# Patient Record
Sex: FEMALE | Race: WHITE | Employment: OTHER | URBAN - METROPOLITAN AREA
[De-identification: names, ages, dates, MRNs, and addresses within clinical notes are randomized per-mention and may not be internally consistent; named-entity substitution may affect disease eponyms.]

---

## 2024-06-30 ENCOUNTER — APPOINTMENT (OUTPATIENT)
Dept: CT IMAGING | Age: 64
End: 2024-06-30
Payer: COMMERCIAL

## 2024-06-30 ENCOUNTER — APPOINTMENT (OUTPATIENT)
Dept: GENERAL RADIOLOGY | Age: 64
End: 2024-06-30
Payer: COMMERCIAL

## 2024-06-30 ENCOUNTER — HOSPITAL ENCOUNTER (EMERGENCY)
Age: 64
Discharge: HOME | End: 2024-06-30
Payer: COMMERCIAL

## 2024-06-30 VITALS
DIASTOLIC BLOOD PRESSURE: 95 MMHG | HEART RATE: 85 BPM | OXYGEN SATURATION: 97 % | SYSTOLIC BLOOD PRESSURE: 175 MMHG | RESPIRATION RATE: 16 BRPM | TEMPERATURE: 97.6 F

## 2024-06-30 DIAGNOSIS — S52.502A CLOSED FRACTURE OF DISTAL END OF LEFT RADIUS, UNSPECIFIED FRACTURE MORPHOLOGY, INITIAL ENCOUNTER: Primary | ICD-10-CM

## 2024-06-30 PROCEDURE — 70486 CT MAXILLOFACIAL W/O DYE: CPT

## 2024-06-30 PROCEDURE — 70450 CT HEAD/BRAIN W/O DYE: CPT

## 2024-06-30 PROCEDURE — 71045 X-RAY EXAM CHEST 1 VIEW: CPT

## 2024-06-30 PROCEDURE — 29125 APPL SHORT ARM SPLINT STATIC: CPT

## 2024-06-30 PROCEDURE — 99284 EMERGENCY DEPT VISIT MOD MDM: CPT

## 2024-06-30 PROCEDURE — 73110 X-RAY EXAM OF WRIST: CPT

## 2024-06-30 PROCEDURE — 6370000000 HC RX 637 (ALT 250 FOR IP): Performed by: NURSE PRACTITIONER

## 2024-06-30 RX ORDER — HYDROCODONE BITARTRATE AND ACETAMINOPHEN 5; 325 MG/1; MG/1
1 TABLET ORAL ONCE
Status: COMPLETED | OUTPATIENT
Start: 2024-06-30 | End: 2024-06-30

## 2024-06-30 RX ORDER — HYDROCODONE BITARTRATE AND ACETAMINOPHEN 5; 325 MG/1; MG/1
1 TABLET ORAL EVERY 6 HOURS PRN
Qty: 12 TABLET | Refills: 0 | Status: SHIPPED | OUTPATIENT
Start: 2024-06-30 | End: 2024-07-03

## 2024-06-30 RX ADMIN — HYDROCODONE BITARTRATE AND ACETAMINOPHEN 1 TABLET: 5; 325 TABLET ORAL at 19:01

## 2024-06-30 ASSESSMENT — PAIN DESCRIPTION - DESCRIPTORS
DESCRIPTORS: ACHING
DESCRIPTORS: ACHING

## 2024-06-30 ASSESSMENT — PAIN DESCRIPTION - ORIENTATION
ORIENTATION: LEFT
ORIENTATION: LEFT

## 2024-06-30 ASSESSMENT — PAIN SCALES - GENERAL
PAINLEVEL_OUTOF10: 6
PAINLEVEL_OUTOF10: 4

## 2024-06-30 ASSESSMENT — PAIN DESCRIPTION - LOCATION
LOCATION: FACE
LOCATION: ARM

## 2024-06-30 ASSESSMENT — PAIN - FUNCTIONAL ASSESSMENT: PAIN_FUNCTIONAL_ASSESSMENT: 0-10

## 2024-06-30 NOTE — ED PROVIDER NOTES
(.cctime)       PAST MEDICAL HISTORY      has a past medical history of Obesity and Uterine cancer.     EMERGENCY DEPARTMENT COURSE and DIFFERENTIAL DIAGNOSIS/MDM:   Vitals:    Vitals:    06/30/24 1644 06/30/24 1858   BP: (!) 163/94 (!) 175/95   Pulse: (!) 101 85   Resp: 15 16   Temp: 97.3 °F (36.3 °C) 97.6 °F (36.4 °C)   TempSrc: Oral Oral   SpO2: 97% 97%       Patient was given the following medications:  Medications   HYDROcodone-acetaminophen (NORCO) 5-325 MG per tablet 1 tablet (1 tablet Oral Given 6/30/24 1901)             Is this patient to be included in the SEP-1 Core Measure due to severe sepsis or septic shock?   No   Exclusion criteria - the patient is NOT to be included for SEP-1 Core Measure due to:  2+ SIRS criteria are not met        Chronic Conditions affecting care:    has a past medical history of Obesity and Uterine cancer.    CONSULTS: (Who and What was discussed)  None      Records Reviewed (External and Source)     CC/HPI Summary, DDx, ED Course, and Reassessment: Patient presented to the emergency department today after she had suffered a fall while playing basketball with a child.  Apparently she fell forward actually struck her left side of face on pavement causing some bruising but no open injury.  She also reported landing on the left wrist which displayed some bruising.  Unfortunately her wrist was fractured.  She has a closed comminuted impacted intra-articular fracture of the distal radius.  Her extremity was splinted with a sugar-tong splint.  Splint was applied by my ER nurse and I performed a post splint review which displayed a appropriately placed stirrup splint neurovascular intact.  Brisk cap refill to digits of the hand.  Looks well and well performed.  She was placed in a sling.  As far as her bruising to her face CT scan of face and CT scan of brain without contrast were performed which displayed no evidence of bony abnormality or fracture.  She has no evidence of orbital

## 2024-07-01 ASSESSMENT — VISUAL ACUITY: OU: 1

## 2024-07-02 ENCOUNTER — OFFICE VISIT (OUTPATIENT)
Dept: ORTHOPEDIC SURGERY | Age: 64
End: 2024-07-02
Payer: COMMERCIAL

## 2024-07-02 VITALS — HEIGHT: 69 IN | WEIGHT: 279 LBS | BODY MASS INDEX: 41.32 KG/M2

## 2024-07-02 DIAGNOSIS — S52.552A OTHER CLOSED EXTRA-ARTICULAR FRACTURE OF DISTAL END OF LEFT RADIUS, INITIAL ENCOUNTER: Primary | ICD-10-CM

## 2024-07-02 PROCEDURE — 99204 OFFICE O/P NEW MOD 45 MIN: CPT | Performed by: ORTHOPAEDIC SURGERY

## 2024-07-03 ENCOUNTER — PREP FOR PROCEDURE (OUTPATIENT)
Dept: ORTHOPEDIC SURGERY | Age: 64
End: 2024-07-03

## 2024-07-03 DIAGNOSIS — S52.552A CLOSED EXTRAARTICULAR FRACTURE OF DISTAL RADIUS, LEFT, INITIAL ENCOUNTER: ICD-10-CM

## 2024-07-03 RX ORDER — LISINOPRIL 10 MG/1
10 TABLET ORAL DAILY
COMMUNITY

## 2024-07-03 NOTE — PROGRESS NOTES
Ruth Hahn    Age 63 y.o.    female    1960    MRN [id number]    7/8/2024  Arrival Time_____________  OR Time____________115 Min     Procedure(s):  LEFT DISTAL RADIUS OPEN REDUCTION INTERNAL FIXATION                               ARTHRE[facility] NOTE:  SUPRACLAVICULAR BLOCK                      General   Surgeon(s):  Johnny Gann, MD      DAY ADMIT ___  SDS/OP ___  OUTPT IN BED ___        Phone [phone] (home)                  PCP _____________________ Phone_________________ Epic ( ) Epic CE ( ) Appt ________    NOTES: _________________________________________ Consult/Cardio _______________    ____________________________________________________________________________    ____________________________________________________________________________  PAT APPT DATE:________ TIME: ________  FAXED QAD: _______  (__) H&P w/ Hospitalist    (__) PAT orders in EPIC    (__) Meet with PAT nurse  __________________________________________________________________________  Preop Nurse phone screen complete: _____________  (__) CBC     (__) W/ DIFF ___________  (__) CT CHEST  __________   (__) Hgb A1C    ___________  (__) CHEST X RAY   __________  (__) LIPID PROFILE  ___________  (__) EKG   __________  (__) PT-INR / APTT  ___________  (__) PFT's   __________  (__) BMP   ___________  (__) CAROTIDS  __________  (__) CMP   ___________  (__) VEIN MAPPING  __________  (__) U/A   ___________  ( X ) HISTORY & PHYSICAL __________  (__) URINE C & S  ___________  (__) CARDIAC CLEARANCE __________  (__) U/A W/ FLEX  ___________  (__) PULM. CLEARANCE __________  (__) SERUM PREGNANCY ___________  (__) Preop Orders in EPIC __________  (__) TYPE & SCREEN __________repeat ( ) (__)  __________________ __________  (__) Albumin   ___________  (__)  __________________ __________  (__) TRANSFERRIN  ___________  (__)  __________________ __________  (__) LIVER PROFILE  ___________  (__) URINE PREG DOS __________  (__) MRSA NASAL

## 2024-07-03 NOTE — H&P (VIEW-ONLY)
ORTHOPAEDIC SURGERY INITIAL EVALUATION NOTE  Chief Complaint   Patient presents with    Wrist Injury     L WRIST FX      HISTORY OF PRESENT ILLNESS:  63-year-old left-hand-dominant female presents for evaluation of a left wrist injury.  She sustained an injury while playing basketball. She sustained a mechanical fall onto an outstretched left wrist. She had pain and deformity. She presented to the ER at [facility]. Xrays demonstrated a displaced distal radius fracture.  She was placed into a well-padded splint and provided with orthopedic follow-up.  She indicates that her pain is consistent.  It is a constant ache in the wrist.  She denies elbow or shoulder pain.  Her pain is improved with pain medication.  She has remained in the splint.  She denies numbness or tingling.  Her pain is moderate in severity.    Past Medical History:   Diagnosis Date    Obesity     Uterine cancer (HCC)        Current Outpatient Medications   Medication Sig Dispense Refill    HYDROcodone-acetaminophen (NORCO) 5-325 MG per tablet Take 1 tablet by mouth every 6 hours as needed for Pain for up to 3 days. Intended supply: 3 days. Take lowest dose possible to manage pain Max Daily Amount: 4 tablets 12 tablet 0     No current facility-administered medications for this visit.        Past Surgical History:   Procedure Laterality Date    HYSTERECTOMY (CERVIX STATUS UNKNOWN)         Allergies   Allergen Reactions    Erythromycin        History reviewed. No pertinent family history.    Social History     Socioeconomic History    Marital status:      Spouse name: Not on file    Number of children: Not on file    Years of education: Not on file    Highest education level: Not on file   Occupational History    Not on file   Tobacco Use    Smoking status: Never    Smokeless tobacco: Not on file   Substance and Sexual Activity    Alcohol use: No    Drug use: No    Sexual activity: Yes     Partners: Male   Other Topics Concern

## 2024-07-03 NOTE — PROGRESS NOTES
Ruth Hahn    Age 63 y.o.    female    1960    MRN [id number]    7/8/2024  Arrival Time_____________  OR Time____________115 Min     Procedure(s):  LEFT DISTAL RADIUS OPEN REDUCTION INTERNAL FIXATION                               ARTHREX NOTE:  SUPRACLAVICULAR BLOCK                      General   Surgeon(s):  Johnny Gann, MD      DAY ADMIT ___  SDS/OP ___  OUTPT IN BED ___        Phone [phone] (home)                  PCP _____________________ Phone_________________ Epic ( ) Epic CE ( ) Appt ________    NOTES: _________________________________________ Consult/Cardio _______________    ____________________________________________________________________________    ____________________________________________________________________________  PAT APPT DATE:________ TIME: ________  FAXED QAD: _______  (__) H&P w/ Hospitalist    (__) PAT orders in EPIC    (__) Meet with PAT nurse  __________________________________________________________________________  Preop Nurse phone screen complete: _____________  (__) CBC     (__) W/ DIFF ___________  (__) CT CHEST  __________   (__) Hgb A1C    ___________  (__) CHEST X RAY   __________  (__) LIPID PROFILE  ___________  (__) EKG   __________  (__) PT-INR / APTT  ___________  (__) PFT's   __________  (__) BMP   ___________  (__) CAROTIDS  __________  (__) CMP   ___________  (__) VEIN MAPPING  __________  (__) U/A   ___________  ( X ) HISTORY & PHYSICAL __________  (__) URINE C & S  ___________  (__) CARDIAC CLEARANCE __________  (__) U/A W/ FLEX  ___________  (__) PULM. CLEARANCE __________  (__) SERUM PREGNANCY ___________  (__) Preop Orders in EPIC __________  (__) TYPE & SCREEN __________repeat ( ) (__)  __________________ __________  (__) Albumin   ___________  (__)  __________________ __________  (__) TRANSFERRIN  ___________  (__)  __________________ __________  (__) LIVER PROFILE  ___________  (__) URINE PREG DOS __________  (__) MRSA NASAL

## 2024-07-03 NOTE — PROGRESS NOTES
Surgery Date and Time: 7/8/24 @ 04:30 pm   Arrival Time:  02:30 pm    The instructions given when and if a patient needs to stop oral intake prior to surgery varies. Follow the instructions you were given by your    Surgeon or RN during the Pre-op call.       __X__Nothing to eat or to drink after Midnight the night before the surgery. NO gum, mints, candy or ice chips day of surgery.                  Only take the following medications with a small sip of water the morning of surgery:  none                 Hold the following medications (per anesthesia or surgeon request):   lisinopril       Last Dose: 7/7/24      Aspirin, Ibuprofen, Advil, Naproxen, Vitamin E and other Anti-inflammatory products and supplements should be stopped for 5 -7days before surgery      or as directed by your physician.     - Do not smoke or vape, and do not drink any alcoholic beverages 24 hours prior to surgery, this includes NA Beer. Refrain from using any recreational drugs,     including non-prescribed prescription drugs.     -You may brush your teeth and gargle the morning of surgery.  DO NOT SWALLOW WATER.    -You MUST plan for a responsible adult to stay on site while you are here and take you home after your surgery. You will not be allowed to leave alone or drive               yourself home. It is requested someone stay with you the first 24 hrs. Your surgery will be cancelled if you do not have a ride home with a responsible adult.    -A parent/legal guardian must accompany a child scheduled for surgery and plan to stay at the hospital until the child is discharged.  Please do not bring other                children with you.    -Please wear simple, loose-fitting clothing to the hospital. Do not bring valuables (money, credit cards, checkbooks, etc.) Do not wear any makeup (including                no eye makeup) and no nail polish if applicable.             - DO NOT wear any jewelry or body piercings day of surgery.  All body

## 2024-07-03 NOTE — PROGRESS NOTES
of radial height.  There is a butterfly fragment displaced volarly.  There is loss of radial inclination and radial height.    ASSESSMENT AND PLAN    63 y.o. female with the following orthopaedic surgery problems:    Left distal radius fracture with loss of radial height, radial inclination, displaced volar butterfly fragment.    I reviewed the x-rays in detail with the patient and her  today.  I reviewed surgical and nonsurgical options.  Given the displacement and shortening of the radius, would recommend ORIF.  Risk, benefits, alternatives, and standard postoperative recovery course was described.  The patient would like to proceed with surgical intervention as recommended.  Tentative plan for surgery 7/8/2024 at [facility].  This will be performed as an outpatient under general anesthesia with supplemental supraclavicular nerve block.  She received perioperative Ancef.  I discussed the time course for recovery.  All questions answered.    Johnny Gann MD

## 2024-07-05 ENCOUNTER — ANESTHESIA EVENT (OUTPATIENT)
Dept: OPERATING ROOM | Age: 64
End: 2024-07-05
Payer: COMMERCIAL

## 2024-07-08 ENCOUNTER — ANESTHESIA (OUTPATIENT)
Dept: OPERATING ROOM | Age: 64
End: 2024-07-08
Payer: COMMERCIAL

## 2024-07-08 ENCOUNTER — HOSPITAL ENCOUNTER (OUTPATIENT)
Age: 64
Setting detail: OUTPATIENT SURGERY
Discharge: HOME | End: 2024-07-08
Attending: ORTHOPAEDIC SURGERY | Admitting: ORTHOPAEDIC SURGERY
Payer: COMMERCIAL

## 2024-07-08 ENCOUNTER — APPOINTMENT (OUTPATIENT)
Dept: GENERAL RADIOLOGY | Age: 64
End: 2024-07-08
Attending: ORTHOPAEDIC SURGERY
Payer: COMMERCIAL

## 2024-07-08 VITALS
BODY MASS INDEX: 41.32 KG/M2 | RESPIRATION RATE: 21 BRPM | OXYGEN SATURATION: 92 % | WEIGHT: 279 LBS | HEART RATE: 77 BPM | DIASTOLIC BLOOD PRESSURE: 73 MMHG | SYSTOLIC BLOOD PRESSURE: 129 MMHG | HEIGHT: 69 IN | TEMPERATURE: 97 F

## 2024-07-08 DIAGNOSIS — S52.552A CLOSED EXTRAARTICULAR FRACTURE OF DISTAL RADIUS, LEFT, INITIAL ENCOUNTER: Primary | ICD-10-CM

## 2024-07-08 LAB
ANION GAP SERPL CALCULATED.3IONS-SCNC: 7 MMOL/L (ref 3–16)
BUN SERPL-MCNC: 13 MG/DL (ref 7–20)
CALCIUM SERPL-MCNC: 9.2 MG/DL (ref 8.3–10.6)
CHLORIDE SERPL-SCNC: 101 MMOL/L (ref 99–110)
CO2 SERPL-SCNC: 26 MMOL/L (ref 21–32)
CREAT SERPL-MCNC: 0.7 MG/DL (ref 0.6–1.2)
GFR SERPLBLD CREATININE-BSD FMLA CKD-EPI: >90 ML/MIN/{1.73_M2}
GLUCOSE BLD-MCNC: 85 MG/DL (ref 70–99)
GLUCOSE SERPL-MCNC: 97 MG/DL (ref 70–99)
PERFORMED ON: NORMAL
POTASSIUM SERPL-SCNC: 4.1 MMOL/L (ref 3.5–5.1)
SODIUM SERPL-SCNC: 134 MMOL/L (ref 136–145)

## 2024-07-08 PROCEDURE — 6360000002 HC RX W HCPCS: Performed by: ORTHOPAEDIC SURGERY

## 2024-07-08 PROCEDURE — 2500000003 HC RX 250 WO HCPCS: Performed by: ANESTHESIOLOGY

## 2024-07-08 PROCEDURE — 3600000014 HC SURGERY LEVEL 4 ADDTL 15MIN: Performed by: ORTHOPAEDIC SURGERY

## 2024-07-08 PROCEDURE — 6370000000 HC RX 637 (ALT 250 FOR IP): Performed by: ANESTHESIOLOGY

## 2024-07-08 PROCEDURE — 7100000000 HC PACU RECOVERY - FIRST 15 MIN: Performed by: ORTHOPAEDIC SURGERY

## 2024-07-08 PROCEDURE — 7100000010 HC PHASE II RECOVERY - FIRST 15 MIN: Performed by: ORTHOPAEDIC SURGERY

## 2024-07-08 PROCEDURE — 3700000001 HC ADD 15 MINUTES (ANESTHESIA): Performed by: ORTHOPAEDIC SURGERY

## 2024-07-08 PROCEDURE — 64415 NJX AA&/STRD BRCH PLXS IMG: CPT | Performed by: ANESTHESIOLOGY

## 2024-07-08 PROCEDURE — 2500000003 HC RX 250 WO HCPCS: Performed by: NURSE ANESTHETIST, CERTIFIED REGISTERED

## 2024-07-08 PROCEDURE — 7100000011 HC PHASE II RECOVERY - ADDTL 15 MIN: Performed by: ORTHOPAEDIC SURGERY

## 2024-07-08 PROCEDURE — 73110 X-RAY EXAM OF WRIST: CPT

## 2024-07-08 PROCEDURE — 3600000004 HC SURGERY LEVEL 4 BASE: Performed by: ORTHOPAEDIC SURGERY

## 2024-07-08 PROCEDURE — C1713 ANCHOR/SCREW BN/BN,TIS/BN: HCPCS | Performed by: ORTHOPAEDIC SURGERY

## 2024-07-08 PROCEDURE — 25608 OPTX DST RD XART FX/EPI SEP2: CPT | Performed by: ORTHOPAEDIC SURGERY

## 2024-07-08 PROCEDURE — 80048 BASIC METABOLIC PNL TOTAL CA: CPT

## 2024-07-08 PROCEDURE — 6360000002 HC RX W HCPCS: Performed by: NURSE ANESTHETIST, CERTIFIED REGISTERED

## 2024-07-08 PROCEDURE — 7100000001 HC PACU RECOVERY - ADDTL 15 MIN: Performed by: ORTHOPAEDIC SURGERY

## 2024-07-08 PROCEDURE — 2709999900 HC NON-CHARGEABLE SUPPLY: Performed by: ORTHOPAEDIC SURGERY

## 2024-07-08 PROCEDURE — 3700000000 HC ANESTHESIA ATTENDED CARE: Performed by: ORTHOPAEDIC SURGERY

## 2024-07-08 PROCEDURE — 6360000002 HC RX W HCPCS: Performed by: ANESTHESIOLOGY

## 2024-07-08 PROCEDURE — C1769 GUIDE WIRE: HCPCS | Performed by: ORTHOPAEDIC SURGERY

## 2024-07-08 PROCEDURE — 2580000003 HC RX 258: Performed by: NURSE ANESTHETIST, CERTIFIED REGISTERED

## 2024-07-08 DEVICE — KREULOCK SCREW TI 3.5X14: Type: IMPLANTABLE DEVICE | Site: WRIST | Status: FUNCTIONAL

## 2024-07-08 DEVICE — PLATE BNE STD 3 H L VOLAR DST RAD TI NAR COMPHSVE: Type: IMPLANTABLE DEVICE | Site: WRIST | Status: FUNCTIONAL

## 2024-07-08 DEVICE — KREULOCK SCREW TI 3.5X16: Type: IMPLANTABLE DEVICE | Site: WRIST | Status: FUNCTIONAL

## 2024-07-08 DEVICE — VAL KREULOCK SCREW TI 2.4X22: Type: IMPLANTABLE DEVICE | Site: WRIST | Status: FUNCTIONAL

## 2024-07-08 DEVICE — SCREW BNE L16MM DIA3.5MM ANK TI LO PROF: Type: IMPLANTABLE DEVICE | Site: WRIST | Status: FUNCTIONAL

## 2024-07-08 DEVICE — SCREW BNE L18MM DIA2.4MM VOLAR DST WRST TI VAR ANG LOK FULL: Type: IMPLANTABLE DEVICE | Site: WRIST | Status: FUNCTIONAL

## 2024-07-08 DEVICE — VAL KREULOCK SCREW TI 2.4X20: Type: IMPLANTABLE DEVICE | Site: WRIST | Status: FUNCTIONAL

## 2024-07-08 RX ORDER — BUPIVACAINE HYDROCHLORIDE AND EPINEPHRINE 2.5; 5 MG/ML; UG/ML
INJECTION, SOLUTION EPIDURAL; INFILTRATION; INTRACAUDAL; PERINEURAL
Status: COMPLETED | OUTPATIENT
Start: 2024-07-08 | End: 2024-07-08

## 2024-07-08 RX ORDER — MIDAZOLAM HYDROCHLORIDE 1 MG/ML
2 INJECTION INTRAMUSCULAR; INTRAVENOUS
Status: COMPLETED | OUTPATIENT
Start: 2024-07-08 | End: 2024-07-08

## 2024-07-08 RX ORDER — CEFAZOLIN SODIUM IN 0.9 % NACL 2 G/100 ML
2000 PLASTIC BAG, INJECTION (ML) INTRAVENOUS ONCE
Status: COMPLETED | OUTPATIENT
Start: 2024-07-08 | End: 2024-07-08

## 2024-07-08 RX ORDER — SODIUM CHLORIDE 0.9 % (FLUSH) 0.9 %
5-40 SYRINGE (ML) INJECTION PRN
Status: DISCONTINUED | OUTPATIENT
Start: 2024-07-08 | End: 2024-07-08 | Stop reason: HOSPADM

## 2024-07-08 RX ORDER — LABETALOL HYDROCHLORIDE 5 MG/ML
10 INJECTION, SOLUTION INTRAVENOUS
Status: DISCONTINUED | OUTPATIENT
Start: 2024-07-08 | End: 2024-07-08 | Stop reason: HOSPADM

## 2024-07-08 RX ORDER — OXYCODONE HYDROCHLORIDE 5 MG/1
5 TABLET ORAL PRN
Status: DISCONTINUED | OUTPATIENT
Start: 2024-07-08 | End: 2024-07-08 | Stop reason: HOSPADM

## 2024-07-08 RX ORDER — DEXAMETHASONE SODIUM PHOSPHATE 4 MG/ML
INJECTION, SOLUTION INTRA-ARTICULAR; INTRALESIONAL; INTRAMUSCULAR; INTRAVENOUS; SOFT TISSUE PRN
Status: DISCONTINUED | OUTPATIENT
Start: 2024-07-08 | End: 2024-07-08 | Stop reason: SDUPTHER

## 2024-07-08 RX ORDER — HYDROCODONE BITARTRATE AND ACETAMINOPHEN 5; 325 MG/1; MG/1
1 TABLET ORAL EVERY 6 HOURS PRN
Qty: 28 TABLET | Refills: 0 | Status: SHIPPED | OUTPATIENT
Start: 2024-07-08 | End: 2024-07-15

## 2024-07-08 RX ORDER — SODIUM CHLORIDE, SODIUM LACTATE, POTASSIUM CHLORIDE, CALCIUM CHLORIDE 600; 310; 30; 20 MG/100ML; MG/100ML; MG/100ML; MG/100ML
INJECTION, SOLUTION INTRAVENOUS CONTINUOUS
Status: DISCONTINUED | OUTPATIENT
Start: 2024-07-08 | End: 2024-07-08 | Stop reason: HOSPADM

## 2024-07-08 RX ORDER — OXYCODONE HYDROCHLORIDE 5 MG/1
10 TABLET ORAL PRN
Status: DISCONTINUED | OUTPATIENT
Start: 2024-07-08 | End: 2024-07-08 | Stop reason: HOSPADM

## 2024-07-08 RX ORDER — SODIUM CHLORIDE 0.9 % (FLUSH) 0.9 %
5-40 SYRINGE (ML) INJECTION EVERY 12 HOURS SCHEDULED
Status: DISCONTINUED | OUTPATIENT
Start: 2024-07-08 | End: 2024-07-08 | Stop reason: HOSPADM

## 2024-07-08 RX ORDER — SODIUM CHLORIDE, SODIUM LACTATE, POTASSIUM CHLORIDE, CALCIUM CHLORIDE 600; 310; 30; 20 MG/100ML; MG/100ML; MG/100ML; MG/100ML
INJECTION, SOLUTION INTRAVENOUS CONTINUOUS PRN
Status: DISCONTINUED | OUTPATIENT
Start: 2024-07-08 | End: 2024-07-08 | Stop reason: SDUPTHER

## 2024-07-08 RX ORDER — NALOXONE HYDROCHLORIDE 0.4 MG/ML
INJECTION, SOLUTION INTRAMUSCULAR; INTRAVENOUS; SUBCUTANEOUS PRN
Status: DISCONTINUED | OUTPATIENT
Start: 2024-07-08 | End: 2024-07-08 | Stop reason: HOSPADM

## 2024-07-08 RX ORDER — ONDANSETRON 2 MG/ML
INJECTION INTRAMUSCULAR; INTRAVENOUS PRN
Status: DISCONTINUED | OUTPATIENT
Start: 2024-07-08 | End: 2024-07-08 | Stop reason: SDUPTHER

## 2024-07-08 RX ORDER — FENTANYL CITRATE 50 UG/ML
INJECTION, SOLUTION INTRAMUSCULAR; INTRAVENOUS PRN
Status: DISCONTINUED | OUTPATIENT
Start: 2024-07-08 | End: 2024-07-08 | Stop reason: SDUPTHER

## 2024-07-08 RX ORDER — LIDOCAINE HYDROCHLORIDE 20 MG/ML
INJECTION, SOLUTION INFILTRATION; PERINEURAL PRN
Status: DISCONTINUED | OUTPATIENT
Start: 2024-07-08 | End: 2024-07-08 | Stop reason: SDUPTHER

## 2024-07-08 RX ORDER — ROCURONIUM BROMIDE 10 MG/ML
INJECTION, SOLUTION INTRAVENOUS PRN
Status: DISCONTINUED | OUTPATIENT
Start: 2024-07-08 | End: 2024-07-08 | Stop reason: SDUPTHER

## 2024-07-08 RX ORDER — TIZANIDINE 2 MG/1
2 TABLET ORAL EVERY 8 HOURS PRN
Qty: 30 TABLET | Refills: 0 | Status: SHIPPED | OUTPATIENT
Start: 2024-07-08

## 2024-07-08 RX ORDER — SODIUM CHLORIDE 9 MG/ML
INJECTION, SOLUTION INTRAVENOUS PRN
Status: DISCONTINUED | OUTPATIENT
Start: 2024-07-08 | End: 2024-07-08 | Stop reason: HOSPADM

## 2024-07-08 RX ORDER — PROPOFOL 10 MG/ML
INJECTION, EMULSION INTRAVENOUS PRN
Status: DISCONTINUED | OUTPATIENT
Start: 2024-07-08 | End: 2024-07-08 | Stop reason: SDUPTHER

## 2024-07-08 RX ORDER — KETAMINE HCL IN NACL, ISO-OSM 100MG/10ML
SYRINGE (ML) INJECTION PRN
Status: DISCONTINUED | OUTPATIENT
Start: 2024-07-08 | End: 2024-07-08 | Stop reason: SDUPTHER

## 2024-07-08 RX ORDER — LIDOCAINE HYDROCHLORIDE 10 MG/ML
1 INJECTION, SOLUTION EPIDURAL; INFILTRATION; INTRACAUDAL; PERINEURAL
Status: DISCONTINUED | OUTPATIENT
Start: 2024-07-08 | End: 2024-07-08 | Stop reason: HOSPADM

## 2024-07-08 RX ORDER — ONDANSETRON 2 MG/ML
4 INJECTION INTRAMUSCULAR; INTRAVENOUS EVERY 30 MIN PRN
Status: DISCONTINUED | OUTPATIENT
Start: 2024-07-08 | End: 2024-07-08 | Stop reason: HOSPADM

## 2024-07-08 RX ADMIN — HYDROMORPHONE HYDROCHLORIDE 0.5 MG: 1 INJECTION, SOLUTION INTRAMUSCULAR; INTRAVENOUS; SUBCUTANEOUS at 16:43

## 2024-07-08 RX ADMIN — DEXAMETHASONE SODIUM PHOSPHATE 4 MG: 4 INJECTION, SOLUTION INTRAMUSCULAR; INTRAVENOUS at 15:22

## 2024-07-08 RX ADMIN — FENTANYL CITRATE 50 MCG: 50 INJECTION, SOLUTION INTRAMUSCULAR; INTRAVENOUS at 15:30

## 2024-07-08 RX ADMIN — ROCURONIUM BROMIDE 20 MG: 50 INJECTION, SOLUTION INTRAVENOUS at 15:18

## 2024-07-08 RX ADMIN — SODIUM CHLORIDE, SODIUM LACTATE, POTASSIUM CHLORIDE, AND CALCIUM CHLORIDE: .6; .31; .03; .02 INJECTION, SOLUTION INTRAVENOUS at 16:34

## 2024-07-08 RX ADMIN — MIDAZOLAM 2 MG: 1 INJECTION INTRAMUSCULAR; INTRAVENOUS at 14:33

## 2024-07-08 RX ADMIN — SUGAMMADEX 200 MG: 100 INJECTION, SOLUTION INTRAVENOUS at 16:28

## 2024-07-08 RX ADMIN — HYDROMORPHONE HYDROCHLORIDE 0.5 MG: 1 INJECTION, SOLUTION INTRAMUSCULAR; INTRAVENOUS; SUBCUTANEOUS at 16:48

## 2024-07-08 RX ADMIN — SODIUM CHLORIDE, SODIUM LACTATE, POTASSIUM CHLORIDE, AND CALCIUM CHLORIDE: .6; .31; .03; .02 INJECTION, SOLUTION INTRAVENOUS at 14:58

## 2024-07-08 RX ADMIN — Medication 2 AMPULE: at 14:35

## 2024-07-08 RX ADMIN — ROCURONIUM BROMIDE 50 MG: 50 INJECTION, SOLUTION INTRAVENOUS at 15:05

## 2024-07-08 RX ADMIN — Medication 2000 MG: at 15:16

## 2024-07-08 RX ADMIN — BUPIVACAINE HYDROCHLORIDE AND EPINEPHRINE BITARTRATE 20 ML: 2.5; .005 INJECTION, SOLUTION EPIDURAL; INFILTRATION; INTRACAUDAL; PERINEURAL at 14:20

## 2024-07-08 RX ADMIN — Medication 10 MG: at 15:33

## 2024-07-08 RX ADMIN — PROPOFOL 150 MG: 10 INJECTION, EMULSION INTRAVENOUS at 15:05

## 2024-07-08 RX ADMIN — OXYCODONE 10 MG: 5 TABLET ORAL at 17:05

## 2024-07-08 RX ADMIN — ONDANSETRON 4 MG: 2 INJECTION INTRAMUSCULAR; INTRAVENOUS at 15:22

## 2024-07-08 RX ADMIN — HYDROMORPHONE HYDROCHLORIDE 0.5 MG: 1 INJECTION, SOLUTION INTRAMUSCULAR; INTRAVENOUS; SUBCUTANEOUS at 16:58

## 2024-07-08 RX ADMIN — FENTANYL CITRATE 50 MCG: 50 INJECTION, SOLUTION INTRAMUSCULAR; INTRAVENOUS at 15:14

## 2024-07-08 RX ADMIN — LIDOCAINE HYDROCHLORIDE 80 MG: 20 INJECTION, SOLUTION INFILTRATION; PERINEURAL at 15:05

## 2024-07-08 ASSESSMENT — PAIN DESCRIPTION - LOCATION
LOCATION: ARM

## 2024-07-08 ASSESSMENT — PAIN SCALES - GENERAL
PAINLEVEL_OUTOF10: 0
PAINLEVEL_OUTOF10: 10

## 2024-07-08 ASSESSMENT — PAIN DESCRIPTION - ORIENTATION
ORIENTATION: LEFT

## 2024-07-08 NOTE — PROGRESS NOTES
Patient admitted to [facility] room 9 in preparation for surgery, VSS. Consent confirmed. IV inserted into right AC, LR infusing. Belongings on [facility] cart. NPO since 2130. Family at bedside, phone number in system for text updates, call light within reach.

## 2024-07-08 NOTE — DISCHARGE INSTRUCTIONS
all sleep apnea patients:  ? Sleep on your side or sitting up in a chair whenever possible, especially the first 24 hours after surgery.  ? Use only medicines prescribed by your doctor.    ? Do not drink alcohol.  ? If you have a dental device to assist you while at rest, use it at all times for the first 24 hours.  For patients using CPAP machines:  ? Use your CPAP machine during all periods of sleep as usual.  ? Use your CPAP machine during all periods of daytime rest while on pain medicines.  ** Follow up with your primary care doctor for continued care.    IF YOU DO NOT TAKE ALL OF YOUR NARCOTIC PAIN MEDICATION, please dispose of them responsibly. There are drop off boxes in the Emergency Departments 24/7 at both [facility] and [place]. If these locations are not convenient, other options for discarding them can be found at:  [email or web address]    Hospital or office staff may NOT accept any medications to drop off in the cabinet for you.

## 2024-07-08 NOTE — OP NOTE
Operative Note      Patient: Ruth Hahn  YOB: 1960  MRN: [id number]    Date of Procedure: 7/8/2024    Pre-Op Diagnosis Codes:  LEFT DISTAL RADIUS VOLAR OREILLY FRACTURE    Post-Op Diagnosis: Same       Procedure(s):  LEFT DISTAL RADIUS OPEN REDUCTION INTERNAL FIXATION     Surgeon(s):  Johnny Gann MD    Assistant:   Surgical Assistant: Ashley Fleming    Anesthesia: General    Estimated Blood Loss (mL): less than 50     Complications: None    Specimens:   * No specimens in log *    Implants:  Implant Name Type Inv. Item Serial No.  Lot No. LRB No. Used Action   PLATE BNE STD 3 H L VOLAR DST RAD TI DEX COMPHSVE - [id number]  PLATE BNE STD 3 H L VOLAR DST RAD TI DEX COMPHSVE  [employer]  Left 1 Implanted   [place] KRE[place]OCK SCREW TI 2.4X22 - [id number]  [place] KREULOCK SCREW TI 2.4X22  [employer]  Left 1 Implanted   [place] KRE[place]OCK SCREW TI 2.4X20 - [id number]  [place] KRE[place]OCK SCREW TI 2.4X20  [employer]  Left 2 Implanted   SCREW BNE L16MM DIA3.5MM ANK TI LO PROF - [id number]  SCREW BNE L16MM DIA3.5MM ANK TI LO PROF  [employer]  Left 2 Implanted   KREULOCK SCREW TI 3.5X14 - [id number]  KREULOCK SCREW TI 3.5X14  [employer]  Left 1 Implanted   KREULOCK SCREW TI 3.5X16 - [id number]  KREULOCK SCREW TI 3.5X16  [employer]  Left 1 Implanted   SCREW BNE L18MM DIA2.4MM VOLAR DST WRST TI GALDINO ANG JOSE FULL - [id number]  SCREW BNE L18MM DIA2.4MM VOLAR DST WRST TI GALDINO ANG OJSE FULL  [employer]  Left 1 Implanted     Tourniquet time: 44 min @ 250mmHg      Drains: * No LDAs found *    Findings:  Infection Present At Time Of Surgery (PATOS) (choose all levels that have infection present):  No infection present  Other Findings:  displaced volar oreilly type fracture pattern with shortening.    Detailed Description of Procedure:   The patient was positively identified in the preoperative holding area by the attending surgeon.  Informed consent was verified and placed on the chart.  The

## 2024-07-08 NOTE — PROGRESS NOTES
Patient meets criteria for discharge per policy. Patient up to the bathroom to void without difficulty. Discharge instructions given to patient and family, verbalized understanding. PIV removed. Patient discharged via wheelchair to the care of their family in stable condition.

## 2024-07-08 NOTE — ANESTHESIA PROCEDURE NOTES
Peripheral Block    Patient location during procedure: pre-op  Reason for block: post-op pain management and at surgeon's request  Start time: 7/8/2024 2:20 PM  End time: 7/8/2024 2:30 PM  Staffing  Performed: anesthesiologist   Anesthesiologist: Dustin Mata MD  Performed by: Dustin Mata MD  Authorized by: Dustin Mata MD    Preanesthetic Checklist  Completed: patient identified, IV checked, site marked, risks and benefits discussed, surgical/procedural consents, anesthesia consent given, oxygen available, monitors applied/VS acknowledged and fire risk safety assessment completed and verbalized  Peripheral Block   Patient position: sitting  Prep: ChloraPrep  Provider prep: sterile gloves  Patient monitoring: responsive to questions, oxygen, IV access, frequent blood pressure checks, continuous pulse ox and cardiac monitor  Block type: Brachial plexus  Supraclavicular  Laterality: left  Injection technique: single-shot  Guidance: ultrasound guided    Needle   Needle type: short-bevel   Needle gauge: 21 G  Needle localization: ultrasound guidance  Needle length: 8 cm  Assessment   Injection assessment: negative aspiration for heme, no paresthesia on injection, local visualized surrounding nerve on ultrasound and no intravascular symptoms  Paresthesia pain: none  Slow fractionated injection: yes  Hemodynamics: stable  Outcomes: patient tolerated procedure well    Medications Administered  BUPivacaine 0.25%-EPINEPHrine injection 1:164050 - Perineural   20 mL - 7/8/2024 2:20:00 PM

## 2024-07-08 NOTE — ANESTHESIA POSTPROCEDURE EVALUATION
Department of Anesthesiology  Postprocedure Note    Patient: Ruth Hahn  MRN: [id number]  YOB: 1960  Date of evaluation: 7/8/2024    Procedure Summary       Date: 07/08/24 Room / Location: [address]    Anesthesia Start: 1500 Anesthesia Stop: 1642    Procedure: LEFT DISTAL RADIUS OPEN REDUCTION INTERNAL FIXATION                               ARTHREX NOTE:  SUPRACLAVICULAR BLOCK (Left: Arm Lower) Diagnosis:       Closed extraarticular fracture of distal radius, left, initial encounter      (Closed extraarticular fracture of distal radius, left, initial encounter [S52.552A])    Surgeons: Johnny Gann MD Responsible Provider: Dustin Mata MD    Anesthesia Type: general ASA Status: 2            Anesthesia Type: No value filed.    Chon Phase I: Chon Score: 10    Chon Phase II: Chon Score: 10    Anesthesia Post Evaluation    Patient location during evaluation: PACU  Level of consciousness: awake  Airway patency: patent  Nausea & Vomiting: no vomiting  Cardiovascular status: blood pressure returned to baseline  Respiratory status: acceptable  Hydration status: stable  Pain management: adequate    No notable events documented.

## 2024-07-08 NOTE — PROGRESS NOTES
Time out performed with Dr. Mata for left supraclavicular nerve block. Patient placed on telemetry, continuous pulse oximetry for the procedure. BP cycled every five minutes. Cardiac rhythm is SR. Patient tolerated well, VSS stable throughout. Will continue to monitor VS every 15 minutes post procedure. Side rails in place, call light within reach, once alert patient instructed to press call button if assistance is need, verbalized understanding.

## 2024-07-08 NOTE — INTERVAL H&P NOTE
Update History & Physical    The patient's History and Physical of July 3, 2024 was reviewed with the patient and I examined the patient. There was no change. The surgical site was confirmed by the patient and me.     Plan: The risks, benefits, expected outcome, and alternative to the recommended procedure have been discussed with the patient. Patient understands and wants to proceed with the procedure.     Electronically signed by Johnny Gann MD on 7/8/2024 at 2:44 PM

## 2024-07-08 NOTE — PROGRESS NOTES
Patient arrived to PACU bay 3, phase one initiated. Placed on bedside monitor, VSS. Report obtained from OR RN and anesthesia. Patient on O2 via simple mask at 6L. See flowsheets for assessment. Warm blankets applied. Side rails in place, will monitor patient closely.

## 2024-07-08 NOTE — ANESTHESIA PRE PROCEDURE
Department of Anesthesiology  Preprocedure Note       Name:  Ruth Hahn   Age:  63 y.o.  :  1960                                          MRN:  [id number]         Date:  2024      Surgeon: Surgeon(s):  Johnny Gann MD    Procedure: Procedure(s):  LEFT DISTAL RADIUS OPEN REDUCTION INTERNAL FIXATION                               ARTHREX NOTE:  SUPRACLAVICULAR BLOCK    Medications prior to admission:   Prior to Admission medications    Medication Sig Start Date End Date Taking? Authorizing Provider   Apremilast (OTEZLA PO) Take 1 tablet by mouth daily   Yes ProviderGerda MD   lisinopril (PRINIVIL;ZESTRIL) 10 MG tablet Take 1 tablet by mouth daily   Yes ProviderGerda MD       Current medications:    Current Facility-Administered Medications   Medication Dose Route Frequency Provider Last Rate Last Admin   • lidocaine PF 1 % injection 1 mL  1 mL IntraDERmal Once PRN John Jackson MD       • lactated ringers IV soln infusion   IntraVENous Continuous John Jackson MD       • sodium chloride flush 0.9 % injection 5-40 mL  5-40 mL IntraVENous 2 times per day John Jackson MD       • sodium chloride flush 0.9 % injection 5-40 mL  5-40 mL IntraVENous PRN John Jackson MD       • 0.9 % sodium chloride infusion   IntraVENous PRN John Jackson MD       • midazolam (VERSED) injection 2 mg  2 mg IntraVENous Once PRN John Jackson MD       • alcohol 62% (NOZIN) nasal  2 ampule  2 ampule Nasal Once John Jackson MD           Allergies:    Allergies   Allergen Reactions   • Erythromycin Rash     redness       Problem List:    Patient Active Problem List   Diagnosis Code   • Closed extraarticular fracture of distal radius, left, initial encounter S52.552A       Past Medical History:        Diagnosis Date   • Arthritis    • Closed extraarticular fracture of distal radius     left   • Hypertension    •

## 2024-07-17 ENCOUNTER — OFFICE VISIT (OUTPATIENT)
Dept: ORTHOPEDIC SURGERY | Age: 64
End: 2024-07-17
Payer: COMMERCIAL

## 2024-07-17 VITALS — HEIGHT: 69 IN | WEIGHT: 279 LBS | BODY MASS INDEX: 41.32 KG/M2

## 2024-07-17 DIAGNOSIS — S52.552A CLOSED EXTRAARTICULAR FRACTURE OF DISTAL RADIUS, LEFT, INITIAL ENCOUNTER: ICD-10-CM

## 2024-07-17 DIAGNOSIS — Z47.89 ORTHOPEDIC AFTERCARE: Primary | ICD-10-CM

## 2024-07-17 PROCEDURE — 99024 POSTOP FOLLOW-UP VISIT: CPT | Performed by: ORTHOPAEDIC SURGERY

## 2024-07-17 PROCEDURE — L3908 WHO COCK-UP NONMOLDE PRE OTS: HCPCS | Performed by: ORTHOPAEDIC SURGERY

## 2024-07-17 NOTE — PROGRESS NOTES
ORTHOPAEDIC SURGERY FOLLOWUP VISIT    CHIEF COMPLAINT: Follow-up left distal radius fracture    DATE OF INJURY: 7/1/2024  DIAGNOSIS: Left distal radius fracture  DATE OF SURGERY: 7/8/2024, ORIF left distal radius fracture    HISTORY OF PRESENT ILLNESS:  63-year-old left-hand-dominant female presents for repeat evaluation of left distal radius fracture.  This was largely a volar Lowery type pattern.  She is POD #9.  She reports that she has not used any pain medication.  She has used occasional Tylenol.  Denies fever, chills, night sweats.  Has been compliant with nonweightbearing restrictions.  Denies numbness or tingling.    PHYSICAL EXAM:  General: Well-appearing.  No distress.  Left upper extremity: Presents in well-padded splint.  This is removed in order to examine the skin.  Incisional site is pristinely approximated with nylon suture.  There is mature healing.  There is no active drainage.  No surrounding erythema.  Wrist range of motion demonstrates 70 degrees flexion, 50 degrees extension.  No limitation to pronation or supination.  There is 90 degrees of each. Sensation is intact to light touch in median, radial, ulnar, and axillary distributions.  Motor function is intact to AIN, PIN, ulnar motor nerves.  There is a strong palpable radial pulse, and brisk capillary refill to the fingers.  Compartments are soft and compressible    RADIOGRAPHIC EXAM:  4 views of the left wrist including PA, oblique, lateral, 20 degree incline lateral x-rays demonstrate anatomically positioned distal radius fracture without evidence of hardware failure or complication.  There is overall restoration of radial height, volar tilt, radial inclination.    ASSESSMENT AND PLAN:  POD #9 status post ORIF left distal radius fracture    Nonweightbearing left upper extremity  Transition to removable Velcro wrist brace today  Can remove the brace for hygiene purposes, ice, exercises  Handout of exercises provided today and lieu of

## 2024-07-23 ENCOUNTER — APPOINTMENT (OUTPATIENT)
Dept: GENERAL RADIOLOGY | Age: 64
End: 2024-07-23
Payer: COMMERCIAL

## 2024-07-23 ENCOUNTER — HOSPITAL ENCOUNTER (EMERGENCY)
Age: 64
Discharge: HOME | End: 2024-07-23
Attending: EMERGENCY MEDICINE
Payer: COMMERCIAL

## 2024-07-23 VITALS
SYSTOLIC BLOOD PRESSURE: 136 MMHG | OXYGEN SATURATION: 96 % | WEIGHT: 275 LBS | HEART RATE: 86 BPM | HEIGHT: 69 IN | RESPIRATION RATE: 16 BRPM | BODY MASS INDEX: 40.73 KG/M2 | TEMPERATURE: 98.2 F | DIASTOLIC BLOOD PRESSURE: 86 MMHG
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
## 2024-07-23 DIAGNOSIS — T18.128A FOOD IMPACTION OF ESOPHAGUS, INITIAL ENCOUNTER: Primary | ICD-10-CM

## 2024-07-23 DIAGNOSIS — Z48.02 VISIT FOR SUTURE REMOVAL: ICD-10-CM

## 2024-07-23 LAB
ALBUMIN SERPL-MCNC: 4.1 G/DL (ref 3.4–5)
ALBUMIN/GLOB SERPL: 1.1 {RATIO} (ref 1.1–2.2)
ANION GAP SERPL CALCULATED.3IONS-SCNC: 11 MMOL/L (ref 3–16)
BASOPHILS # BLD: 0.1 K/UL (ref 0–0.2)
BASOPHILS NFR BLD: 0.8 %
BILIRUB SERPL-MCNC: 0.3 MG/DL (ref 0–1)
BUN SERPL-MCNC: 14 MG/DL (ref 7–20)
CALCIUM SERPL-MCNC: 9.2 MG/DL (ref 8.3–10.6)
CHLORIDE SERPL-SCNC: 102 MMOL/L (ref 99–110)
CO2 SERPL-SCNC: 25 MMOL/L (ref 21–32)
CREAT SERPL-MCNC: 0.6 MG/DL (ref 0.6–1.2)
DEPRECATED RDW RBC AUTO: 14.3 % (ref 12.4–15.4)
EOSINOPHIL # BLD: 0.1 K/UL (ref 0–0.6)
EOSINOPHIL NFR BLD: 0.7 %
GFR SERPLBLD CREATININE-BSD FMLA CKD-EPI: >90 ML/MIN/{1.73_M2}
GLUCOSE SERPL-MCNC: 116 MG/DL (ref 70–99)
HCT VFR BLD AUTO: 44.2 % (ref 36–48)
HGB BLD-MCNC: 14.3 G/DL (ref 12–16)
LYMPHOCYTES # BLD: 1.1 K/UL (ref 1–5.1)
LYMPHOCYTES NFR BLD: 13.2 %
MCH RBC QN AUTO: 28.4 PG (ref 26–34)
MCHC RBC AUTO-ENTMCNC: 32.5 G/DL (ref 31–36)
MCV RBC AUTO: 87.5 FL (ref 80–100)
MONOCYTES # BLD: 0.4 K/UL (ref 0–1.3)
MONOCYTES NFR BLD: 5 %
NEUTROPHILS # BLD: 6.9 K/UL (ref 1.7–7.7)
NEUTROPHILS NFR BLD: 80.3 %
PLATELET # BLD AUTO: 299 K/UL (ref 135–450)
PMV BLD AUTO: 8 FL (ref 5–10.5)
PROT SERPL-MCNC: 8 G/DL (ref 6.4–8.2)
RBC # BLD AUTO: 5.05 M/UL (ref 4–5.2)
SODIUM SERPL-SCNC: 138 MMOL/L (ref 136–145)
TROPONIN, HIGH SENSITIVITY: 6 NG/L (ref 0–14)
WBC # BLD AUTO: 8.6 K/UL (ref 4–11)

## 2024-07-23 PROCEDURE — 80053 COMPREHEN METABOLIC PANEL: CPT

## 2024-07-23 PROCEDURE — 71045 X-RAY EXAM CHEST 1 VIEW: CPT

## 2024-07-23 PROCEDURE — 99284 EMERGENCY DEPT VISIT MOD MDM: CPT

## 2024-07-23 PROCEDURE — 84484 ASSAY OF TROPONIN QUANT: CPT

## 2024-07-23 PROCEDURE — 85025 COMPLETE CBC W/AUTO DIFF WBC: CPT

## 2024-07-23 RX ORDER — LORAZEPAM 2 MG/ML
1 INJECTION INTRAMUSCULAR ONCE
Status: DISCONTINUED | OUTPATIENT
Start: 2024-07-23 | End: 2024-07-23 | Stop reason: HOSPADM

## 2024-07-23 RX ORDER — OMEPRAZOLE 40 MG/1
40 CAPSULE, DELAYED RELEASE ORAL
Qty: 30 CAPSULE | Refills: 0 | Status: SHIPPED | OUTPATIENT
Start: 2024-07-23

## 2024-07-23 RX ORDER — NITROGLYCERIN 0.4 MG/1
0.4 TABLET SUBLINGUAL ONCE
Status: DISCONTINUED | OUTPATIENT
Start: 2024-07-23 | End: 2024-07-23 | Stop reason: HOSPADM

## 2024-07-23 RX ORDER — GLUCAGON 1 MG/ML
1 KIT INJECTION ONCE
Status: DISCONTINUED | OUTPATIENT
Start: 2024-07-23 | End: 2024-07-23 | Stop reason: HOSPADM

## 2024-07-23 RX ORDER — 0.9 % SODIUM CHLORIDE 0.9 %
1000 INTRAVENOUS SOLUTION INTRAVENOUS ONCE
Status: DISCONTINUED | OUTPATIENT
Start: 2024-07-23 | End: 2024-07-23 | Stop reason: HOSPADM

## 2024-07-23 ASSESSMENT — ENCOUNTER SYMPTOMS
NAUSEA: 0
SHORTNESS OF BREATH: 0
VOMITING: 1
DIARRHEA: 0
VOICE CHANGE: 0
TROUBLE SWALLOWING: 0

## 2024-07-23 NOTE — ED NOTES
Pt states she thinks she passed food that was stuck in throat. Pt given 3 ounces of water for swallow eval. Pt able to swallow s difficulty.Christine Salmeron RN

## 2024-07-23 NOTE — DISCHARGE INSTRUCTIONS
Please follow-up with gastroenterology and your primary care doctor.  If your symptoms worsen please come back to the ER.

## 2024-07-23 NOTE — ED PROVIDER NOTES
[facility] ED  eMERGENCY dEPARTMENT eNCOUnter      Pt Name: Ruth Hahn  MRN: [id number]  Birthdate 1960  Date of evaluation: 7/23/2024  Provider: Seamus Ochoa MD    CHIEF COMPLAINT       Chief Complaint   Patient presents with    food bolus     Pt states that after dinner she has been unable to keep fluids down;          HISTORY OF PRESENT ILLNESS   (Location/Symptom, Timing/Onset, Context/Setting, Quality, Duration, Modifying Factors, Severity)  Note limiting factors.     History obtained from: The patient    Ruth Hahn is a 63 y.o. female who reports that she was eating ground beef taco meat at 9 PM last night and feels like it got stuck in her throat and esophagus.  Patient has any difficulty breathing however reports she has been unable to keep any fluids down since this happened at 9 PM last night and if she attempts to eat or drink anything she immediately vomits it back up.  The patient reports that she has had 1 previous episode of something getting stuck in her throat however she drank carbonated beverages and it went down and she never did go to the emergency department for see any type of physician for this.  Denies any known history of esophageal stricture or tumor.      HPI    Nursing Notes were reviewed.    REVIEW OFSYSTEMS    (2-9 systems for level 4, 10 or more for level 5)     Review of Systems   Constitutional:  Negative for fever.   HENT:  Negative for trouble swallowing and voice change.    Eyes:  Negative for visual disturbance.   Respiratory:  Negative for shortness of breath.    Cardiovascular:  Negative for chest pain and palpitations.   Gastrointestinal:  Positive for vomiting. Negative for diarrhea and nausea.   Genitourinary:  Negative for dysuria.   Musculoskeletal:  Negative for gait problem.   Neurological:  Negative for seizures and syncope.   Psychiatric/Behavioral:  Negative for self-injury and suicidal ideas.        Except as noted above the remainder

## 2024-08-09 ENCOUNTER — TELEPHONE (OUTPATIENT)
Dept: ORTHOPEDIC SURGERY | Age: 64
End: 2024-08-09

## 2024-08-09 NOTE — TELEPHONE ENCOUNTER
General Question     Subject: [facility]   Patient and /or Facility Request: Terrence Hahn   Contact Number: [phone]       [facility]ED [facility]PD[facility] TO [employer] [facility]ING [facility]

## 2024-08-27 ENCOUNTER — OFFICE VISIT (OUTPATIENT)
Dept: ORTHOPEDIC SURGERY | Age: 64
End: 2024-08-27

## 2024-08-27 VITALS — HEIGHT: 69 IN | WEIGHT: 275 LBS | BODY MASS INDEX: 40.73 KG/M2

## 2024-08-27 DIAGNOSIS — Z47.89 ORTHOPEDIC AFTERCARE: Primary | ICD-10-CM

## 2024-08-27 PROCEDURE — 99024 POSTOP FOLLOW-UP VISIT: CPT | Performed by: ORTHOPAEDIC SURGERY

## 2024-08-29 NOTE — PROGRESS NOTES
ORTHOPAEDIC SURGERY FOLLOWUP VISIT     CHIEF COMPLAINT: Follow-up left distal radius fracture     DATE OF INJURY: 7/1/2024  DIAGNOSIS: Left distal radius fracture  DATE OF SURGERY: 7/8/2024, ORIF left distal radius fracture     HISTORY OF PRESENT ILLNESS:  63-year-old left-hand-dominant female presents for repeat evaluation of left distal radius fracture.  This was largely a volar Lowery type pattern.  She is 7 weeks postoperative.  She reports that she has not used any pain medication.  She has used occasional Tylenol.  Denies fever, chills, night sweats.  She has weaned from the brace and has progressed the use of her upper extremity as tolerated.  She has very little limitations at this time point.  Denies numbness or tingling.  Very happy with her result.     PHYSICAL EXAM:  General: Well-appearing.  No distress.  Left upper extremity: Presents without immobilization.  Incisional site is pristinely healed.  There is no active drainage.  There is minor nodularity of her incisional site.  No significant tenderness.  No surrounding erythema.  Wrist range of motion demonstrates 90 degrees flexion, 60 degrees extension.  No limitation to pronation or supination.  There is 90 degrees of each. Sensation is intact to light touch in median, radial, ulnar, and axillary distributions.  Motor function is intact to AIN, PIN, ulnar motor nerves.  There is a strong palpable radial pulse, and brisk capillary refill to the fingers.  Compartments are soft and compressible     RADIOGRAPHIC EXAM:  4 views of the left wrist including PA, oblique, lateral, 20 degree incline lateral x-rays demonstrate anatomically positioned distal radius fracture without evidence of hardware failure or complication.  There is overall restoration of radial height, volar tilt, radial inclination.  Fracture site is radiographically occult at this time point.     ASSESSMENT AND PLAN:  7 weeks status post ORIF left distal radius fracture     [facility]

## (undated) DEVICE — LIQUIBAND RAPID ADHESIVE 36/CS 0.8ML: Brand: MEDLINE

## (undated) DEVICE — GLOVE SURG SZ 75 L12IN FNGR THK79MIL GRN LTX FREE

## (undated) DEVICE — SET IRRIG L94IN DIA0.281IN L BOR W/ 2 N VENT SPIK 2 ON/OFF

## (undated) DEVICE — DRAPE C ARM W46XL120IN XLN

## (undated) DEVICE — GUIDEWIRE ORTH L150MM DIA0.053IN W/ TRCR TIP FOR ANK FRAC

## (undated) DEVICE — PAD,ABDOMINAL,8"X10",ST,LF: Brand: MEDLINE

## (undated) DEVICE — SUTURE VICRYL + SZ 2-0 L36IN ABSRB UD L36MM CT-1 1/2 CIR VCP945H

## (undated) DEVICE — BIT DRL DIA1.7MM LNG FT ANK FOR COMPHSVE SYS

## (undated) DEVICE — PACK PROCEDURE SURG EXTREMITY

## (undated) DEVICE — SUTURE MONOCRYL SZ 3-0 L27IN ABSRB UD PS-2 3/8 CIR REV CUT NDL MCP427H

## (undated) DEVICE — GAUZE,SPONGE,4"X4",8PLY,STRL,LF,10/TRAY: Brand: MEDLINE

## (undated) DEVICE — BANDAGE COMPR W4INXL15FT BGE E SGL LAYERED CLP CLSR

## (undated) DEVICE — PENCIL SMK EVAC ALL IN 1 DSGN ENH VISIBILITY IMPROVED AIR

## (undated) DEVICE — PADDING CAST W6INXL4YD ST COT RAYON MICROPLEATED HIGHLY

## (undated) DEVICE — BIT DRL DIA2.5MM GRAD FOR 3.5MM LOK CORT SCR

## (undated) DEVICE — PADDING CAST W4INXL4YD ST COT RAYON MICROPLEATED HIGHLY

## (undated) DEVICE — DRAPE SURG UPPER EXTREMITY

## (undated) DEVICE — GLOVE ORTHO 7   MSG9470

## (undated) DEVICE — SOLUTION IRRIG 2000ML 0.9% SOD CHL USP UROMATIC PLAS CONT